# Patient Record
Sex: FEMALE | Race: OTHER | URBAN - METROPOLITAN AREA
[De-identification: names, ages, dates, MRNs, and addresses within clinical notes are randomized per-mention and may not be internally consistent; named-entity substitution may affect disease eponyms.]

---

## 2017-11-02 ENCOUNTER — IMPORTED ENCOUNTER (OUTPATIENT)
Dept: URBAN - METROPOLITAN AREA CLINIC 50 | Facility: CLINIC | Age: 67
End: 2017-11-02

## 2017-12-04 ENCOUNTER — IMPORTED ENCOUNTER (OUTPATIENT)
Dept: URBAN - METROPOLITAN AREA CLINIC 50 | Facility: CLINIC | Age: 67
End: 2017-12-04

## 2018-02-01 ENCOUNTER — IMPORTED ENCOUNTER (OUTPATIENT)
Dept: URBAN - METROPOLITAN AREA CLINIC 50 | Facility: CLINIC | Age: 68
End: 2018-02-01

## 2018-02-08 ENCOUNTER — IMPORTED ENCOUNTER (OUTPATIENT)
Dept: URBAN - METROPOLITAN AREA CLINIC 50 | Facility: CLINIC | Age: 68
End: 2018-02-08

## 2018-02-12 ENCOUNTER — IMPORTED ENCOUNTER (OUTPATIENT)
Dept: URBAN - METROPOLITAN AREA CLINIC 50 | Facility: CLINIC | Age: 68
End: 2018-02-12

## 2018-02-12 NOTE — PATIENT DISCUSSION
"""S/P IOL OS: Sensar AAB00 23.50. Continue post operative instructions. ."" ""Continue Pred - Gatifloxacin - Bromfenac left eye four times a day.  4/3/2/1 at 1 week intervals"""

## 2018-02-22 ENCOUNTER — IMPORTED ENCOUNTER (OUTPATIENT)
Dept: URBAN - METROPOLITAN AREA CLINIC 50 | Facility: CLINIC | Age: 68
End: 2018-02-22

## 2018-02-26 ENCOUNTER — IMPORTED ENCOUNTER (OUTPATIENT)
Dept: URBAN - METROPOLITAN AREA CLINIC 50 | Facility: CLINIC | Age: 68
End: 2018-02-26

## 2018-02-26 NOTE — PATIENT DISCUSSION
"""S/P IOL OD: Sensar AAB00 +22.50 (Target: Distance). Continue post operative instructions and drops per schedule. "" ""Continue Pred - Gatifloxacin - Bromfenac right eye four times a day. 4/3/2/1 at 1 week intervals"" ""Continue Pred-Gati-Brom left eye .  follow schedule"""

## 2018-03-08 ENCOUNTER — IMPORTED ENCOUNTER (OUTPATIENT)
Dept: URBAN - METROPOLITAN AREA CLINIC 50 | Facility: CLINIC | Age: 68
End: 2018-03-08

## 2018-03-29 ENCOUNTER — IMPORTED ENCOUNTER (OUTPATIENT)
Dept: URBAN - METROPOLITAN AREA CLINIC 50 | Facility: CLINIC | Age: 68
End: 2018-03-29

## 2018-04-05 ENCOUNTER — IMPORTED ENCOUNTER (OUTPATIENT)
Dept: URBAN - METROPOLITAN AREA CLINIC 50 | Facility: CLINIC | Age: 68
End: 2018-04-05

## 2018-07-12 ENCOUNTER — IMPORTED ENCOUNTER (OUTPATIENT)
Dept: URBAN - METROPOLITAN AREA CLINIC 50 | Facility: CLINIC | Age: 68
End: 2018-07-12

## 2019-12-27 ENCOUNTER — IMPORTED ENCOUNTER (OUTPATIENT)
Dept: URBAN - METROPOLITAN AREA CLINIC 50 | Facility: CLINIC | Age: 69
End: 2019-12-27

## 2019-12-27 NOTE — PATIENT DISCUSSION
"""Monitor ERM for changes. Informed patient of potential for worsening. Instructed patient to call with changes in vision.  Recommend regular Amsler grid checks. "" ""OCT Macula: OD: Stable

## 2021-04-17 ASSESSMENT — TONOMETRY
OD_IOP_MMHG: 16
OS_IOP_MMHG: 30
OD_IOP_MMHG: 16
OS_IOP_MMHG: 16
OD_IOP_MMHG: 12
OS_IOP_MMHG: 14
OD_IOP_MMHG: 18
OS_IOP_MMHG: 18
OD_IOP_MMHG: 13
OD_IOP_MMHG: 15
OS_IOP_MMHG: 17
OD_IOP_MMHG: 07
OS_IOP_MMHG: 16
OS_IOP_MMHG: 13
OS_IOP_MMHG: 17
OD_IOP_MMHG: 18

## 2021-04-17 ASSESSMENT — VISUAL ACUITY
OS_CC: J5
OS_CC: J1
OS_CC: J2NEAR
OD_SC: 20/50-
OS_SC: 20/20
OS_CC: 20/40+
OD_SC: 20/60-
OS_BAT: 20/400
OS_SC: 20/25-2
OD_SC: 20/30-1
OS_CC: 20/30
OD_SC: 20/30
OD_BAT: 20/400
OS_SC: 20/20
OD_OTHER: 20/400.
OS_SC: 20/50-2
OD_OTHER: 20/400.
OD_OTHER: 20/400.
OD_CC: J1
OD_SC: 20/50+2
OD_BAT: 20/400
OS_BAT: 20/400
OD_SC: 20/50
OS_SC: 20/25
OS_SC: 20/25-
OD_BAT: 20/400
OS_OTHER: 20/400.
OD_CC: J5
OD_CC: J2NEAR
OS_OTHER: 20/400. 20/400.
OD_SC: 20/50
OD_CC: 20/30+-

## 2023-10-26 NOTE — PATIENT DISCUSSION
"""Informed patient that their capsular opacification is not visually significant or does not meet the minimum criteria for capsulotomy.  Recommended attention to PCO symptoms GENERAL: No fever, no chills  EYES: No change in vision  HEENT: No trouble swallowing or speaking  CARDIAC: No chest pain  PULMONARY: No cough, no SOB  GI: +abdominal pain, no nausea, no vomiting, no diarrhea, +constipation  : No changes in urination  SKIN: No rashes  NEURO: No headache, no numbness  MSK: No joint pain  Otherwise as HPI or negative.